# Patient Record
Sex: FEMALE | Race: WHITE | NOT HISPANIC OR LATINO | Employment: OTHER | URBAN - METROPOLITAN AREA
[De-identification: names, ages, dates, MRNs, and addresses within clinical notes are randomized per-mention and may not be internally consistent; named-entity substitution may affect disease eponyms.]

---

## 2019-05-14 ENCOUNTER — HOSPITAL ENCOUNTER (EMERGENCY)
Facility: HOSPITAL | Age: 64
End: 2019-05-15
Attending: EMERGENCY MEDICINE | Admitting: EMERGENCY MEDICINE
Payer: COMMERCIAL

## 2019-05-14 ENCOUNTER — APPOINTMENT (EMERGENCY)
Dept: CT IMAGING | Facility: HOSPITAL | Age: 64
End: 2019-05-14
Payer: COMMERCIAL

## 2019-05-14 VITALS
DIASTOLIC BLOOD PRESSURE: 51 MMHG | TEMPERATURE: 97.6 F | OXYGEN SATURATION: 100 % | WEIGHT: 151.68 LBS | SYSTOLIC BLOOD PRESSURE: 103 MMHG | HEART RATE: 65 BPM | RESPIRATION RATE: 17 BRPM

## 2019-05-14 DIAGNOSIS — S32.010A CLOSED COMPRESSION FRACTURE OF BODY OF L1 VERTEBRA (HCC): Primary | ICD-10-CM

## 2019-05-14 PROCEDURE — 99284 EMERGENCY DEPT VISIT MOD MDM: CPT | Performed by: EMERGENCY MEDICINE

## 2019-05-14 PROCEDURE — 72131 CT LUMBAR SPINE W/O DYE: CPT

## 2019-05-14 PROCEDURE — 99285 EMERGENCY DEPT VISIT HI MDM: CPT

## 2019-05-14 RX ORDER — OXYCODONE HYDROCHLORIDE 5 MG/1
5 TABLET ORAL ONCE
Status: COMPLETED | OUTPATIENT
Start: 2019-05-14 | End: 2019-05-14

## 2019-05-14 RX ORDER — ONDANSETRON 4 MG/1
4 TABLET, ORALLY DISINTEGRATING ORAL ONCE
Status: COMPLETED | OUTPATIENT
Start: 2019-05-14 | End: 2019-05-14

## 2019-05-14 RX ORDER — LIDOCAINE 50 MG/G
1 PATCH TOPICAL ONCE
Status: DISCONTINUED | OUTPATIENT
Start: 2019-05-14 | End: 2019-05-15 | Stop reason: HOSPADM

## 2019-05-14 RX ORDER — ACETAMINOPHEN 325 MG/1
975 TABLET ORAL ONCE
Status: COMPLETED | OUTPATIENT
Start: 2019-05-14 | End: 2019-05-14

## 2019-05-14 RX ADMIN — ONDANSETRON 4 MG: 4 TABLET, ORALLY DISINTEGRATING ORAL at 22:50

## 2019-05-14 RX ADMIN — OXYCODONE HYDROCHLORIDE 5 MG: 5 TABLET ORAL at 23:41

## 2019-05-14 RX ADMIN — OXYCODONE HYDROCHLORIDE 5 MG: 5 TABLET ORAL at 22:50

## 2019-05-14 RX ADMIN — ACETAMINOPHEN 975 MG: 325 TABLET, FILM COATED ORAL at 22:50

## 2019-05-14 RX ADMIN — LIDOCAINE 1 PATCH: 50 PATCH TOPICAL at 23:34

## 2019-05-15 ENCOUNTER — APPOINTMENT (OUTPATIENT)
Dept: RADIOLOGY | Facility: HOSPITAL | Age: 64
End: 2019-05-15
Payer: COMMERCIAL

## 2019-05-15 ENCOUNTER — HOSPITAL ENCOUNTER (OUTPATIENT)
Facility: HOSPITAL | Age: 64
Setting detail: OBSERVATION
Discharge: HOME | End: 2019-05-16
Attending: SURGERY | Admitting: SURGERY
Payer: COMMERCIAL

## 2019-05-15 DIAGNOSIS — S32.010A CLOSED COMPRESSION FRACTURE OF L1 LUMBAR VERTEBRA, INITIAL ENCOUNTER (HCC): Primary | ICD-10-CM

## 2019-05-15 PROBLEM — Z86.69 HX OF MIGRAINE HEADACHES: Status: ACTIVE | Noted: 2019-05-15

## 2019-05-15 LAB
ANION GAP BLD CALC-SCNC: 16 MMOL/L (ref 4–13)
ANION GAP SERPL CALCULATED.3IONS-SCNC: 5 MMOL/L (ref 4–13)
BASOPHILS # BLD AUTO: 0.03 THOUSANDS/ΜL (ref 0–0.1)
BASOPHILS NFR BLD AUTO: 0 % (ref 0–1)
BUN BLD-MCNC: 7 MG/DL (ref 5–25)
BUN SERPL-MCNC: 7 MG/DL (ref 5–25)
CA-I BLD-SCNC: 1.12 MMOL/L (ref 1.12–1.32)
CALCIUM SERPL-MCNC: 8.4 MG/DL (ref 8.3–10.1)
CHLORIDE BLD-SCNC: 103 MMOL/L (ref 100–108)
CHLORIDE SERPL-SCNC: 106 MMOL/L (ref 100–108)
CO2 SERPL-SCNC: 26 MMOL/L (ref 21–32)
CREAT BLD-MCNC: 0.6 MG/DL (ref 0.6–1.3)
CREAT SERPL-MCNC: 0.73 MG/DL (ref 0.6–1.3)
EOSINOPHIL # BLD AUTO: 0.05 THOUSAND/ΜL (ref 0–0.61)
EOSINOPHIL NFR BLD AUTO: 1 % (ref 0–6)
ERYTHROCYTE [DISTWIDTH] IN BLOOD BY AUTOMATED COUNT: 12 % (ref 11.6–15.1)
GFR SERPL CREATININE-BSD FRML MDRD: 87 ML/MIN/1.73SQ M
GFR SERPL CREATININE-BSD FRML MDRD: 96 ML/MIN/1.73SQ M
GLUCOSE SERPL-MCNC: 103 MG/DL (ref 65–140)
GLUCOSE SERPL-MCNC: 123 MG/DL (ref 65–140)
HCT VFR BLD AUTO: 36.9 % (ref 34.8–46.1)
HCT VFR BLD CALC: 37 % (ref 34.8–46.1)
HGB BLD-MCNC: 12.4 G/DL (ref 11.5–15.4)
HGB BLDA-MCNC: 12.6 G/DL (ref 11.5–15.4)
IMM GRANULOCYTES # BLD AUTO: 0.02 THOUSAND/UL (ref 0–0.2)
IMM GRANULOCYTES NFR BLD AUTO: 0 % (ref 0–2)
LYMPHOCYTES # BLD AUTO: 1.27 THOUSANDS/ΜL (ref 0.6–4.47)
LYMPHOCYTES NFR BLD AUTO: 17 % (ref 14–44)
MCH RBC QN AUTO: 30.5 PG (ref 26.8–34.3)
MCHC RBC AUTO-ENTMCNC: 33.6 G/DL (ref 31.4–37.4)
MCV RBC AUTO: 91 FL (ref 82–98)
MONOCYTES # BLD AUTO: 0.69 THOUSAND/ΜL (ref 0.17–1.22)
MONOCYTES NFR BLD AUTO: 9 % (ref 4–12)
NEUTROPHILS # BLD AUTO: 5.31 THOUSANDS/ΜL (ref 1.85–7.62)
NEUTS SEG NFR BLD AUTO: 73 % (ref 43–75)
NRBC BLD AUTO-RTO: 0 /100 WBCS
PCO2 BLD: 24 MMOL/L (ref 21–32)
PLATELET # BLD AUTO: 212 THOUSANDS/UL (ref 149–390)
PLATELET # BLD AUTO: 219 THOUSANDS/UL (ref 149–390)
PMV BLD AUTO: 10.6 FL (ref 8.9–12.7)
PMV BLD AUTO: 11 FL (ref 8.9–12.7)
POTASSIUM BLD-SCNC: 3.6 MMOL/L (ref 3.5–5.3)
POTASSIUM SERPL-SCNC: 3.6 MMOL/L (ref 3.5–5.3)
RBC # BLD AUTO: 4.07 MILLION/UL (ref 3.81–5.12)
SODIUM BLD-SCNC: 138 MMOL/L (ref 136–145)
SODIUM SERPL-SCNC: 137 MMOL/L (ref 136–145)
SPECIMEN SOURCE: ABNORMAL
WBC # BLD AUTO: 7.37 THOUSAND/UL (ref 4.31–10.16)

## 2019-05-15 PROCEDURE — 80047 BASIC METABLC PNL IONIZED CA: CPT

## 2019-05-15 PROCEDURE — 85049 AUTOMATED PLATELET COUNT: CPT | Performed by: EMERGENCY MEDICINE

## 2019-05-15 PROCEDURE — 99024 POSTOP FOLLOW-UP VISIT: CPT | Performed by: PHYSICIAN ASSISTANT

## 2019-05-15 PROCEDURE — 85025 COMPLETE CBC W/AUTO DIFF WBC: CPT | Performed by: PHYSICIAN ASSISTANT

## 2019-05-15 PROCEDURE — G8979 MOBILITY GOAL STATUS: HCPCS

## 2019-05-15 PROCEDURE — 99219 PR INITIAL OBSERVATION CARE/DAY 50 MINUTES: CPT | Performed by: SURGERY

## 2019-05-15 PROCEDURE — 72100 X-RAY EXAM L-S SPINE 2/3 VWS: CPT

## 2019-05-15 PROCEDURE — 72148 MRI LUMBAR SPINE W/O DYE: CPT

## 2019-05-15 PROCEDURE — NC001 PR NO CHARGE: Performed by: SURGERY

## 2019-05-15 PROCEDURE — 99285 EMERGENCY DEPT VISIT HI MDM: CPT

## 2019-05-15 PROCEDURE — 70450 CT HEAD/BRAIN W/O DYE: CPT

## 2019-05-15 PROCEDURE — G8978 MOBILITY CURRENT STATUS: HCPCS

## 2019-05-15 PROCEDURE — 80048 BASIC METABOLIC PNL TOTAL CA: CPT | Performed by: PHYSICIAN ASSISTANT

## 2019-05-15 PROCEDURE — 97163 PT EVAL HIGH COMPLEX 45 MIN: CPT

## 2019-05-15 PROCEDURE — 85014 HEMATOCRIT: CPT

## 2019-05-15 RX ORDER — OXYCODONE HYDROCHLORIDE 5 MG/1
5 TABLET ORAL EVERY 4 HOURS PRN
Status: DISCONTINUED | OUTPATIENT
Start: 2019-05-15 | End: 2019-05-16 | Stop reason: HOSPADM

## 2019-05-15 RX ORDER — METOCLOPRAMIDE HYDROCHLORIDE 5 MG/ML
10 INJECTION INTRAMUSCULAR; INTRAVENOUS EVERY 6 HOURS PRN
Status: DISCONTINUED | OUTPATIENT
Start: 2019-05-15 | End: 2019-05-15

## 2019-05-15 RX ORDER — SODIUM CHLORIDE, SODIUM GLUCONATE, SODIUM ACETATE, POTASSIUM CHLORIDE, MAGNESIUM CHLORIDE, SODIUM PHOSPHATE, DIBASIC, AND POTASSIUM PHOSPHATE .53; .5; .37; .037; .03; .012; .00082 G/100ML; G/100ML; G/100ML; G/100ML; G/100ML; G/100ML; G/100ML
100 INJECTION, SOLUTION INTRAVENOUS CONTINUOUS
Status: DISCONTINUED | OUTPATIENT
Start: 2019-05-15 | End: 2019-05-16 | Stop reason: HOSPADM

## 2019-05-15 RX ORDER — ACETAMINOPHEN 325 MG/1
650 TABLET ORAL EVERY 4 HOURS PRN
Status: DISCONTINUED | OUTPATIENT
Start: 2019-05-15 | End: 2019-05-15

## 2019-05-15 RX ORDER — SENNOSIDES 8.6 MG
8.6 TABLET ORAL DAILY
Status: DISCONTINUED | OUTPATIENT
Start: 2019-05-15 | End: 2019-05-16 | Stop reason: HOSPADM

## 2019-05-15 RX ORDER — OXYCODONE HYDROCHLORIDE 5 MG/1
2.5 TABLET ORAL EVERY 4 HOURS PRN
Status: DISCONTINUED | OUTPATIENT
Start: 2019-05-15 | End: 2019-05-16 | Stop reason: HOSPADM

## 2019-05-15 RX ORDER — ONDANSETRON 2 MG/ML
4 INJECTION INTRAMUSCULAR; INTRAVENOUS EVERY 4 HOURS PRN
Status: DISCONTINUED | OUTPATIENT
Start: 2019-05-15 | End: 2019-05-16 | Stop reason: HOSPADM

## 2019-05-15 RX ORDER — ACETAMINOPHEN 325 MG/1
975 TABLET ORAL EVERY 8 HOURS SCHEDULED
Status: DISCONTINUED | OUTPATIENT
Start: 2019-05-15 | End: 2019-05-16 | Stop reason: HOSPADM

## 2019-05-15 RX ORDER — OXYCODONE HYDROCHLORIDE 10 MG/1
10 TABLET ORAL EVERY 4 HOURS PRN
Status: DISCONTINUED | OUTPATIENT
Start: 2019-05-15 | End: 2019-05-15

## 2019-05-15 RX ORDER — OXYCODONE HYDROCHLORIDE 5 MG/1
5 TABLET ORAL EVERY 4 HOURS PRN
Status: DISCONTINUED | OUTPATIENT
Start: 2019-05-15 | End: 2019-05-15

## 2019-05-15 RX ORDER — METHOCARBAMOL 500 MG/1
500 TABLET, FILM COATED ORAL EVERY 6 HOURS SCHEDULED
Status: DISCONTINUED | OUTPATIENT
Start: 2019-05-15 | End: 2019-05-16 | Stop reason: HOSPADM

## 2019-05-15 RX ADMIN — METHOCARBAMOL 500 MG: 500 TABLET, FILM COATED ORAL at 23:02

## 2019-05-15 RX ADMIN — SODIUM CHLORIDE, SODIUM GLUCONATE, SODIUM ACETATE, POTASSIUM CHLORIDE, MAGNESIUM CHLORIDE, SODIUM PHOSPHATE, DIBASIC, AND POTASSIUM PHOSPHATE 100 ML/HR: .53; .5; .37; .037; .03; .012; .00082 INJECTION, SOLUTION INTRAVENOUS at 11:45

## 2019-05-15 RX ADMIN — ACETAMINOPHEN 975 MG: 325 TABLET ORAL at 23:02

## 2019-05-15 RX ADMIN — METHOCARBAMOL 500 MG: 500 TABLET, FILM COATED ORAL at 17:48

## 2019-05-15 RX ADMIN — ONDANSETRON 4 MG: 2 INJECTION INTRAMUSCULAR; INTRAVENOUS at 04:30

## 2019-05-15 RX ADMIN — METHOCARBAMOL 500 MG: 500 TABLET, FILM COATED ORAL at 05:12

## 2019-05-15 RX ADMIN — ENOXAPARIN SODIUM 40 MG: 40 INJECTION SUBCUTANEOUS at 14:46

## 2019-05-15 RX ADMIN — ONDANSETRON 4 MG: 2 INJECTION INTRAMUSCULAR; INTRAVENOUS at 08:41

## 2019-05-15 RX ADMIN — ACETAMINOPHEN 975 MG: 325 TABLET ORAL at 14:46

## 2019-05-15 RX ADMIN — METOCLOPRAMIDE 10 MG: 5 INJECTION, SOLUTION INTRAMUSCULAR; INTRAVENOUS at 11:48

## 2019-05-16 VITALS
OXYGEN SATURATION: 96 % | HEART RATE: 66 BPM | TEMPERATURE: 98.3 F | RESPIRATION RATE: 18 BRPM | DIASTOLIC BLOOD PRESSURE: 60 MMHG | HEIGHT: 65 IN | BODY MASS INDEX: 25.27 KG/M2 | WEIGHT: 151.68 LBS | SYSTOLIC BLOOD PRESSURE: 108 MMHG

## 2019-05-16 LAB
ANION GAP SERPL CALCULATED.3IONS-SCNC: 4 MMOL/L (ref 4–13)
APTT PPP: 31 SECONDS (ref 26–38)
BASOPHILS # BLD AUTO: 0.03 THOUSANDS/ΜL (ref 0–0.1)
BASOPHILS NFR BLD AUTO: 1 % (ref 0–1)
BUN SERPL-MCNC: 7 MG/DL (ref 5–25)
CALCIUM SERPL-MCNC: 8.3 MG/DL (ref 8.3–10.1)
CHLORIDE SERPL-SCNC: 107 MMOL/L (ref 100–108)
CO2 SERPL-SCNC: 30 MMOL/L (ref 21–32)
CREAT SERPL-MCNC: 0.75 MG/DL (ref 0.6–1.3)
EOSINOPHIL # BLD AUTO: 0.07 THOUSAND/ΜL (ref 0–0.61)
EOSINOPHIL NFR BLD AUTO: 1 % (ref 0–6)
ERYTHROCYTE [DISTWIDTH] IN BLOOD BY AUTOMATED COUNT: 12.2 % (ref 11.6–15.1)
GFR SERPL CREATININE-BSD FRML MDRD: 84 ML/MIN/1.73SQ M
GLUCOSE SERPL-MCNC: 95 MG/DL (ref 65–140)
HCT VFR BLD AUTO: 35.6 % (ref 34.8–46.1)
HGB BLD-MCNC: 11.8 G/DL (ref 11.5–15.4)
IMM GRANULOCYTES # BLD AUTO: 0.01 THOUSAND/UL (ref 0–0.2)
IMM GRANULOCYTES NFR BLD AUTO: 0 % (ref 0–2)
INR PPP: 1.1 (ref 0.86–1.17)
LYMPHOCYTES # BLD AUTO: 1.54 THOUSANDS/ΜL (ref 0.6–4.47)
LYMPHOCYTES NFR BLD AUTO: 28 % (ref 14–44)
MCH RBC QN AUTO: 30.3 PG (ref 26.8–34.3)
MCHC RBC AUTO-ENTMCNC: 33.1 G/DL (ref 31.4–37.4)
MCV RBC AUTO: 92 FL (ref 82–98)
MONOCYTES # BLD AUTO: 0.41 THOUSAND/ΜL (ref 0.17–1.22)
MONOCYTES NFR BLD AUTO: 7 % (ref 4–12)
NEUTROPHILS # BLD AUTO: 3.52 THOUSANDS/ΜL (ref 1.85–7.62)
NEUTS SEG NFR BLD AUTO: 63 % (ref 43–75)
NRBC BLD AUTO-RTO: 0 /100 WBCS
PLATELET # BLD AUTO: 218 THOUSANDS/UL (ref 149–390)
PMV BLD AUTO: 11.2 FL (ref 8.9–12.7)
POTASSIUM SERPL-SCNC: 3.4 MMOL/L (ref 3.5–5.3)
PROTHROMBIN TIME: 14.3 SECONDS (ref 11.8–14.2)
RBC # BLD AUTO: 3.89 MILLION/UL (ref 3.81–5.12)
SODIUM SERPL-SCNC: 141 MMOL/L (ref 136–145)
WBC # BLD AUTO: 5.58 THOUSAND/UL (ref 4.31–10.16)

## 2019-05-16 PROCEDURE — 85610 PROTHROMBIN TIME: CPT | Performed by: RADIOLOGY

## 2019-05-16 PROCEDURE — 80048 BASIC METABOLIC PNL TOTAL CA: CPT | Performed by: PHYSICIAN ASSISTANT

## 2019-05-16 PROCEDURE — 85025 COMPLETE CBC W/AUTO DIFF WBC: CPT | Performed by: PHYSICIAN ASSISTANT

## 2019-05-16 PROCEDURE — NC001 PR NO CHARGE: Performed by: SURGERY

## 2019-05-16 PROCEDURE — 85730 THROMBOPLASTIN TIME PARTIAL: CPT | Performed by: RADIOLOGY

## 2019-05-16 RX ORDER — METHOCARBAMOL 500 MG/1
500 TABLET, FILM COATED ORAL EVERY 6 HOURS SCHEDULED
Qty: 28 TABLET | Refills: 0 | Status: SHIPPED | OUTPATIENT
Start: 2019-05-16

## 2019-05-16 RX ADMIN — METHOCARBAMOL 500 MG: 500 TABLET, FILM COATED ORAL at 05:37

## 2019-05-16 RX ADMIN — ACETAMINOPHEN 975 MG: 325 TABLET ORAL at 05:37

## 2021-02-26 ENCOUNTER — CLINICAL SUPPORT (OUTPATIENT)
Dept: FAMILY MEDICINE | Facility: CLINIC | Age: 66
End: 2021-02-26

## 2021-02-26 DIAGNOSIS — Z23 ENCOUNTER FOR VACCINATION: Primary | ICD-10-CM

## 2021-03-19 ENCOUNTER — CLINICAL SUPPORT (OUTPATIENT)
Dept: FAMILY MEDICINE | Facility: CLINIC | Age: 66
End: 2021-03-19

## 2021-03-19 DIAGNOSIS — Z23 ENCOUNTER FOR VACCINATION: Primary | ICD-10-CM

## 2021-05-07 ENCOUNTER — HOSPITAL ENCOUNTER (OUTPATIENT)
Dept: RADIOLOGY | Facility: HOSPITAL | Age: 66
Discharge: HOME | End: 2021-05-07
Payer: MEDICARE

## 2021-05-07 ENCOUNTER — CONSULT (OUTPATIENT)
Dept: PODIATRY | Facility: CLINIC | Age: 66
End: 2021-05-07
Payer: MEDICARE

## 2021-05-07 VITALS
OXYGEN SATURATION: 96 % | HEIGHT: 64 IN | HEART RATE: 67 BPM | RESPIRATION RATE: 19 BRPM | DIASTOLIC BLOOD PRESSURE: 54 MMHG | BODY MASS INDEX: 24.75 KG/M2 | WEIGHT: 145 LBS | SYSTOLIC BLOOD PRESSURE: 116 MMHG

## 2021-05-07 DIAGNOSIS — M20.12 HALLUX VALGUS OF LEFT FOOT: ICD-10-CM

## 2021-05-07 DIAGNOSIS — M79.672 LEFT FOOT PAIN: Primary | ICD-10-CM

## 2021-05-07 PROCEDURE — G0463 HOSPITAL OUTPT CLINIC VISIT: HCPCS | Performed by: PODIATRIST

## 2021-05-07 PROCEDURE — 99203 OFFICE O/P NEW LOW 30 MIN: CPT | Performed by: PODIATRIST

## 2021-05-07 PROCEDURE — 73630 X-RAY EXAM OF FOOT: CPT | Mod: LT

## 2021-05-07 RX ORDER — PNV NO.95/FERROUS FUM/FOLIC AC 28MG-0.8MG
TABLET ORAL
COMMUNITY

## 2021-05-07 ASSESSMENT — ENCOUNTER SYMPTOMS
WOUND: 0
VOMITING: 0
SHORTNESS OF BREATH: 0
HEMATOLOGIC/LYMPHATIC NEGATIVE: 1
ARTHRALGIAS: 1
ENDOCRINE NEGATIVE: 1
FEVER: 0

## 2021-05-07 ASSESSMENT — PAIN SCALES - GENERAL: PAINLEVEL: 3

## 2021-05-07 NOTE — PROGRESS NOTES
Subjective      I would like your consent to use a new technology to help document today’s visit. The technology is called Dragon Ambient eXperience or MARCUS for short. The AMRCUS technology will securely listen to your visit and convert what it hears into an office visit note. Would you be comfortable using MARCUS during your visit today?    Patient's response: YES    CHIEF COMPLAINT  Bev Oliver is a 66 y.o. female who presents for Consult (Bunion on left foot, big toe)        HPI  HISTORY OF PRESENT ILLNESS  The patient presents for surgical evaluation of a left bunion deformity. She previously had a bunionectomy on the right foot in 2013 or 2014 by Dr. Coronado. The patient was referred by an existing patient. Her pain is localized to her left bunion deformity. She remarks that the pain fluctuates in intensity. The patient remarks that she uses a lot of menthol for pain management. She denies previous knowledge of a left fifth metatarsal fracture, which is observed radiographically. The patient remarks that she has an extra bone in her feet that has been present since birth.    Left bunion deformity the pain is exacerbated with weightbearing activity and tight fitting shoes.        The following have been reviewed and updated as appropriate in this visit:    PAST HISTORY  • Medical:  Past Medical History:   Diagnosis Date   • H/O thumb surgery 1998     • Surgical:   Past Surgical History:   Procedure Laterality Date   • BUNIONECTOMY  2013       FAMILY HISTORY  No family history on file.    SOCIAL HISTORY  Social History     Occupational History   • Not on file   Tobacco Use   • Smoking status: Never Smoker   • Smokeless tobacco: Never Used   Substance and Sexual Activity   • Alcohol use: Not on file   • Drug use: Not on file   • Sexual activity: Not on file   Social History Narrative   • Not on file       ALLERGY  No Known Allergies    CURRENT MEDICATIONS  Current Outpatient Medications   Medication Sig Dispense Refill  "  • calcium carbonate-vitamin D3 (Calcium 500 With D) 500 mg(1,250mg) -400 unit tablet Take by mouth     • diphenhydrAMINE (BENADRYL) 25 mg capsule prn sleep     • carbamazepine (CARBATROL) 100 mg 12 hr capsule 1 in am, 1 at night 50 10   • levETIRAcetam (KEPPRA) 500 mg tablet 1 in am and 1 at night 120 6     No current facility-administered medications for this visit.       REVIEW OF SYSTEMS  Review of Systems   Constitutional: Negative for fever.   Respiratory: Negative for shortness of breath.    Cardiovascular: Negative for chest pain.   Gastrointestinal: Negative for vomiting.   Endocrine: Negative.    Musculoskeletal: Positive for arthralgias.   Skin: Negative for wound.   Hematological: Negative.        VITALS  /54   Pulse 67   Resp 19   Ht 1.626 m (5' 4\")   Wt 65.8 kg (145 lb)   SpO2 96%   BMI 24.89 kg/m²     PHYSICAL EXAMINATION  General Appearance:   No acute distress. Well-appearing. Patient appears stated age.       Psychiatric:   Well oriented. Normal affect.       Head:   Normal visual tracking. Hearing intact to spoken word. Normocephalic.      Lungs:   Unlabored breathing. No supplemental oxygen needed.      Vascular:   Palpable pedal pulses bilaterally.  Capillary fill time intact at the level of the digits.      Neurological:   Grossly intact to light touch. Epicritic sensation intact.       Integument:   Warm, dry and supple. Integument intact. No open lesions or macerations. No ulcerations.       Musculoskeletal:   Lateral deviation of bilateral hallux is observed, left greater than right. Engine motion to the left first MPJ is without pain or crepitus. There is limited range of motion to the right first MPJ.  Bilateral foot muscle strength graded 5/5 with dorsiflexion, plantarflexion, inversion, eversion.    ASSESSMENT  Problem List Items Addressed This Visit     None      Visit Diagnoses     Left foot pain    -  Primary    Relevant Orders    X-ray foot 3 or more views left " (Completed)    Hallux valgus of left foot            X-ray of the left foot, taken today, was reviewed during the visit. Diagnosis of hallux valgus is reviewed and discussed with the patient. Handout on bunion deformity is provided and reviewed.  Patient feels that she has exhausted all conservative measures present. Surgically I have discussed a Lapiplasty bunionectomy procedure. A handout on Lapiplasty is provided the patient and reviewed in detail. Surgical procedure is reviewed and discussed in detail. All risks benefits and alternative methods of treatment are discussed and reviewed.  All questions have been queried and answered. Postoperative rehabilitation was reviewed. Postoperatively patient would be required to utilize an ambulatory cam boot for up to 8 weeks.    PLAN  The patient has chosen to consult with her  and contact the clinic if she chooses to go forward with the procedure.Patient will be provided a left-sided Lapiplasty first metatarsocuneiform joint fusion with possible Fabian osteotomy, possible intercuneiform joint fusion, possible bone graft harvest from calcaneus.    ATTESTATION?   Draft generated by Mohsen VELAZQUEZ and edited by Kandy Murrieta, Quality  on 05/07/21.

## 2021-05-07 NOTE — LETTER
[facility]  [address] [facility] [place] [address]  [phone]  Dept: [phone]  May 17, 2021     Breonna Flannery CNP  [address] [facility] [place] [address]    Patient: Bev Oliver   YOB: 1955   Date of Visit: 5/7/2021       To:  Breonna Flannery CNP    Our mutual patient, Bev Oliver, was seen in my office on 5/7/2021. Below are my notes.     Param Wheat DPM  5/17/2021 10:28 AM  Signed  Subjective      I would like your consent to use a new technology to help document today’s visit. The technology is called Dragon Ambient eXperience or MARCUS for short. The MARCUS technology will securely listen to your visit and convert what it hears into an office visit note. Would you be comfortable using MARCUS during your visit today?    Patient's response: YES    CHIEF COMPLAINT  Bev Oliver is a 66 y.o. female who presents for Consult (Bunion on left foot, big toe)        HPI  HISTORY OF PRESENT ILLNESS  The patient presents for surgical evaluation of a left bunion deformity. She previously had a bunionectomy on the right foot in 2013 or 2014 by Dr. Coronado. The patient was referred by an existing patient. Her pain is localized to her left bunion deformity. She remarks that the pain fluctuates in intensity. The patient remarks that she uses a lot of menthol for pain management. She denies previous knowledge of a left fifth metatarsal fracture, which is observed radiographically. The patient remarks that she has an extra bone in her feet that has been present since birth.    Left bunion deformity the pain is exacerbated with weightbearing activity and tight fitting shoes.        The following have been reviewed and updated as appropriate in this visit:    PAST HISTORY  • Medical:  Past Medical History:   Diagnosis Date   • H/O thumb surgery 1998     • Surgical:   Past Surgical History:   Procedure Laterality Date   • BUNIONECTOMY  2013       FAMILY HISTORY  No family history on  "file.    SOCIAL HISTORY  Social History     Occupational History   • Not on file   Tobacco Use   • Smoking status: Never Smoker   • Smokeless tobacco: Never Used   Substance and Sexual Activity   • Alcohol use: Not on file   • Drug use: Not on file   • Sexual activity: Not on file   Social History Narrative   • Not on file       ALLERGY  No Known Allergies    CURRENT MEDICATIONS  Current Outpatient Medications   Medication Sig Dispense Refill   • calcium carbonate-vitamin D3 (Calcium 500 With D) 500 mg(1,250mg) -400 unit tablet Take by mouth     • diphenhydrAMINE (BENADRYL) 25 mg capsule prn sleep     • carbamazepine (CARBATROL) 100 mg 12 hr capsule 1 in am, 1 at night 50 10   • levETIRAcetam (KEPPRA) 500 mg tablet 1 in am and 1 at night 120 6     No current facility-administered medications for this visit.       REVIEW OF SYSTEMS  Review of Systems   Constitutional: Negative for fever.   Respiratory: Negative for shortness of breath.    Cardiovascular: Negative for chest pain.   Gastrointestinal: Negative for vomiting.   Endocrine: Negative.    Musculoskeletal: Positive for arthralgias.   Skin: Negative for wound.   Hematological: Negative.        VITALS  /54   Pulse 67   Resp 19   Ht 1.626 m (5' 4\")   Wt 65.8 kg (145 lb)   SpO2 96%   BMI 24.89 kg/m²     PHYSICAL EXAMINATION  General Appearance:   No acute distress. Well-appearing. Patient appears stated age.       Psychiatric:   Well oriented. Normal affect.       Head:   Normal visual tracking. Hearing intact to spoken word. Normocephalic.      Lungs:   Unlabored breathing. No supplemental oxygen needed.      Vascular:   Palpable pedal pulses bilaterally.  Capillary fill time intact at the level of the digits.      Neurological:   Grossly intact to light touch. Epicritic sensation intact.       Integument:   Warm, dry and supple. Integument intact. No open lesions or macerations. No ulcerations.       Musculoskeletal:   Lateral deviation of bilateral " hallux is observed, left greater than right. Engine motion to the left first MPJ is without pain or crepitus. There is limited range of motion to the right first MPJ.  Bilateral foot muscle strength graded 5/5 with dorsiflexion, plantarflexion, inversion, eversion.    ASSESSMENT  Problem List Items Addressed This Visit     None      Visit Diagnoses     Left foot pain    -  Primary    Relevant Orders    X-ray foot 3 or more views left (Completed)    Hallux valgus of left foot            X-ray of the left foot, taken today, was reviewed during the visit. Diagnosis of hallux valgus is reviewed and discussed with the patient. Handout on bunion deformity is provided and reviewed.  Patient feels that she has exhausted all conservative measures present. Surgically I have discussed a Lapiplasty bunionectomy procedure. A handout on Lapiplasty is provided the patient and reviewed in detail. Surgical procedure is reviewed and discussed in detail. All risks benefits and alternative methods of treatment are discussed and reviewed.  All questions have been queried and answered. Postoperative rehabilitation was reviewed. Postoperatively patient would be required to utilize an ambulatory cam boot for up to 8 weeks.    PLAN  The patient has chosen to consult with her  and contact the clinic if she chooses to go forward with the procedure.Patient will be provided a left-sided Lapiplasty first metatarsocuneiform joint fusion with possible Fabian osteotomy, possible intercuneiform joint fusion, possible bone graft harvest from calcaneus.    ATTESTATION?   Draft generated by Mohsen VELAZQUEZ and edited by Kandy Murrieta, Quality  on 05/07/21.                       If you have questions, please do not hesitate to call me. I look forward to following your patient along with you.         Sincerely,        Param Wheat DPM        CC: No Recipients

## 2021-07-09 ENCOUNTER — TELEPHONE (OUTPATIENT)
Dept: PODIATRY | Facility: CLINIC | Age: 66
End: 2021-07-09

## 2021-07-09 NOTE — TELEPHONE ENCOUNTER
*RCVD incoming*    Spoke to:  Bev    Patient Identifiers Verified:  1955 x  [address] [facility] [place] [id number]    Reason for call: Bev left a VM with some questions regarding a shot called reclast? That her other doctor is wanting to prescribe but she didn't know if she could get it or not with wanting to schedule surgery.    Is it okay for our Care Team to communicate your response through MyChart:    Patient notified that they should receive a response from a member of their Care Team within 24hrs:

## 2021-07-12 NOTE — TELEPHONE ENCOUNTER
I contacted Bev and discussed the reclast infusion.  I informed her that I would discuss this with Dr. Wheat and call her back with recommendations.  I informed her that Dr. Wheat is out of the office until Thursday and that I would not be able to discuss this with him until he returns, she stated understanding.

## 2021-07-15 ENCOUNTER — TELEPHONE (OUTPATIENT)
Dept: PODIATRY | Facility: CLINIC | Age: 66
End: 2021-07-15

## 2021-07-15 NOTE — TELEPHONE ENCOUNTER
..*VD incoming*    Spoke to:  Bev    Patient Identifiers Verified:  1955 x  [address] [facility] [place] [address]    Reason for call: Patient is calling after seeing Dr. Wheat for a bunion eval.  She would like to have it shaved off instead of doing a lapiplasty.     Is it okay for our Care Team to communicate your response through MyChart: No    Patient notified that they should receive a response from a member of their Care Team within 24hrs: Yes

## 2021-07-15 NOTE — TELEPHONE ENCOUNTER
I contacted Bev and explained per Dr. Wheat that she can proceed with the reclast injection that we dicussed last week. I also explained to her that Dr. Wheat does not feel shaving the bone down is an appropriate way to help her with her bunion.  I explained that it will not fix the pain she is having and ultimately is not something Dr. Wheat is willing to provide her.  She stated she will most likely proceed with the lapiplasty then and will call back to schedule when she figures out some dates that will work for her.

## 2021-07-15 NOTE — TELEPHONE ENCOUNTER
Message left for Bev to contact the clinic to discuss this and the reclast injection from our prior phone visit.

## 2021-11-03 ENCOUNTER — CLINICAL SUPPORT (OUTPATIENT)
Dept: FAMILY MEDICINE | Facility: CLINIC | Age: 66
End: 2021-11-03
Payer: MEDICARE

## 2021-11-03 DIAGNOSIS — Z23 ENCOUNTER FOR VACCINATION: Primary | ICD-10-CM

## 2021-11-03 PROCEDURE — 0003A PFIZER-BIONTECH SARS-COV-2 VACCINE: CPT | Mod: PO | Performed by: NURSE PRACTITIONER

## 2023-04-12 ENCOUNTER — TELEPHONE (OUTPATIENT)
Dept: NEUROLOGY | Facility: CLINIC | Age: 68
End: 2023-04-12
Payer: MEDICARE

## 2023-04-12 NOTE — TELEPHONE ENCOUNTER
Gloria Reese, pt's referring provider, requests that this pt be scheduled with one of our newer Epileptologists, because she feels this patient's case is perplexing as to what is the cause of the pt's seizures.

## 2023-05-25 ENCOUNTER — CONSULT (OUTPATIENT)
Dept: NEUROLOGY | Facility: CLINIC | Age: 68
End: 2023-05-25
Payer: MEDICARE

## 2023-05-25 VITALS
WEIGHT: 149.5 LBS | BODY MASS INDEX: 25.52 KG/M2 | HEART RATE: 70 BPM | RESPIRATION RATE: 16 BRPM | DIASTOLIC BLOOD PRESSURE: 54 MMHG | OXYGEN SATURATION: 96 % | SYSTOLIC BLOOD PRESSURE: 117 MMHG | HEIGHT: 64 IN

## 2023-05-25 DIAGNOSIS — H81.10 BENIGN PAROXYSMAL POSITIONAL VERTIGO, UNSPECIFIED LATERALITY: ICD-10-CM

## 2023-05-25 DIAGNOSIS — G40.109 EPILEPSY, FOCAL (CMS/HCC): Primary | ICD-10-CM

## 2023-05-25 PROCEDURE — G0463 HOSPITAL OUTPT CLINIC VISIT: HCPCS | Performed by: PSYCHIATRY & NEUROLOGY

## 2023-05-25 PROCEDURE — 99204 OFFICE O/P NEW MOD 45 MIN: CPT | Performed by: PSYCHIATRY & NEUROLOGY

## 2023-05-25 RX ORDER — MECLIZINE HCL 25MG 25 MG/1
25 TABLET, CHEWABLE ORAL 3 TIMES DAILY PRN
Qty: 45 TABLET | Refills: 2 | Status: SHIPPED | OUTPATIENT
Start: 2023-05-25

## 2023-05-25 NOTE — PROGRESS NOTES
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  She vaguely recalls her symptoms, often would be preceded by a burning smell and then she will progress to a generalized tonic-clonic seizure.  Afterwards she would be fatigued.  She was treated initially with phenobarbital, then subsequently transition to Tegretol.  Around 2013, her Tegretol dose was gradually being titrated (although she is unable to clarify why, she believes she had been seizure-free for several years), she began having worsening episodes of ataxia, oversedation.  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  Her dose was reduced, and then she moved out of the area.  Lost contact with her neurologist and ran out of the medication around 2016 and did not restart any antiseizure medications thereafter.  She denies any episodes of loss time, waking up with tongue bites or urinary incontinence.  She has remained seizure-free for at least a decade.    She notes since around 2013 she noticed that she developed some dysarthria and ataxia when she is fatigued.  Sleep or rest resolve the symptoms.  She has no weakness, no vision changes.  She says that around December 2022 she had about 1 straight month of dysarthria and difficulty with balance.  She does note that she has some positional vertigo in which if she looks up or turns her head rapidly from side to side, she will develop a spinning sensation that resolves with sitting very still.    She says that riding in the car can trigger her symptoms.    -Last seizure: Around age 39 years old        Epilepsy risk factors:    -Birth history: normal gestation and delivery, full term vaginal delivery, no  cerebral palsy    -Developmental history: no delays    -Infectious - meningitis, encephalitis - HISTORY OF VIRAL ENCEPHALITIS     -Febrile seizures - denies    -Trauma - Traumatic brain injury - denies    -Tumors/Malignancy - denies    -Family History epilepsy - denies       Current anti-epileptic medications:    None    Past anti-epileptic medications:   Phenobarbital    Previous work-up:   -MRI 2013: normal MRI brain        Histories:    Medical:    Past Medical History:   Diagnosis Date    H/O thumb surgery 1998         Surgical:    Past Surgical History:   Procedure Laterality Date    BUNIONECTOMY  2013         Family:  No family history on file.      Social:    Social History     Socioeconomic History    Marital status:      Spouse name: Not on file    Number of children: Not on file    Years of education: Not on file    Highest education level: Not on file   Occupational History    Not on file   Tobacco Use    Smoking status: Never    Smokeless tobacco: Never   Substance and Sexual Activity    Alcohol use: Not on file    Drug use: Not on file    Sexual activity: Not on file   Other Topics Concern    Not on file   Social History Narrative    Not on file     Social Determinants of Health     Financial Resource Strain: Not on file   Food Insecurity: Not on file   Transportation Needs: Not on file   Physical Activity: Not on file   Stress: Not on file   Social Connections: Not on file   Intimate Partner Violence: Not on file   Housing Stability: Not on file       Allergies:  No Known Allergies    Current Medications:    Current Outpatient Medications   Medication Sig Dispense Refill    calcium carbonate-vitamin D3 (Calcium 500 With D) 500 mg(1,250mg) -400 unit tablet Take by mouth      diphenhydrAMINE (BENADRYL) 25 mg capsule prn sleep      carbamazepine (CARBATROL) 100 mg 12 hr capsule 1 in am, 1 at night 50 10    levETIRAcetam (KEPPRA) 500 mg tablet 1 in am and 1 at night 120 6     No current  facility-administered medications for this visit.       Review of Systems:  See HPI. All other systems were reviewed and were negative.     OBJECTIVE           Physical Exam:    General: No acute distress   HEENT: Head is atraumatic.   Pharynx is clear. Mucus membranes are moist. no oral lesions   Neck: Supple. No carotid bruits.   Heart: Regular rate and rhythm. No murmurs noted.   Lungs: Clear to auscultation; normal respiratory effort   Abdomen: non-tender, bowel sounds present   Extremities: No edema or cyanosis. peripheral pulses intact   Skin: no lesions present in visible areas. warm and dry   Psychological: normal affect. alert and oriented.     Neurological exam:   Mentation and Language  She is pleasant and attentive throughout the interview.  She provides a clear autobiographical history.  Alert and oriented to person, place, time and situation. There is no apparent deficit of comprehension. Fluent speech without word finding difficulty or dysarthria. Affect appears appropriate.      Cranial Nerves  Funduscopic exam shows flat disk margins bilaterally. Pupils equally round and reactive to light. Extraocular movements intact without nystagmus. Visual fields intact to confrontation. Face symmetrical at rest and with activation with full sensation to light touch and pinprick in V1, V2 and V3 distributions. The tongue protrudes in the midline. The palate elevates symmetrically. There is no tremor or other movements of the face.  Negative head thrust test    Motor  Right: deltoid 5/5, biceps 5/5, triceps 5/5, interossei 5/5, iliopsoas 5/5, hamstring 5/5, quads 5/5, TA 5/5, gastroc/soleus 5/5    Left: deltoid 5/5, biceps 5/5, triceps 5/5, interossei 5/5, iliopsoas 5/5, hamstring 5/5, quads 5/5, TA 5/5, gastroc/soleus 5/5     Sensation  Intact to light touch, vibration, temperature bilaterally    Muscle Stretch Reflexes  Graded 2+ and symmetric in bilateral biceps, triceps, brachioradialis, patella, and  Achilles.       Coordination  There is no dysmetria with finger nose finger.      Gait and Station  Rises from the seated position without difficulty. Narrow based gait with good arm swing and stride length. No tremor noted. Walks on toes, heels without difficulty.  Imbalance when walking in tandem.  Normal station without swaying with eyes open.  Oscillates but does not break station with eyes closed.  Positive pull test      Labs:      CBC with Platelet:  No results found for: WBC, HGB, HCT, PLT, RBC, MCV, MCH, MCHC, RDW, MPV  Automated Differential: No results found for: NEUTROABS, NEUTROPCT, IMMGRAN, LYMPHOPCT, LYMPHOABS, MONOMAN, MONOPCT, MONOSABS, EOSMAN, EOSPCT, EOSABS, BASOSMAN, BASOSABS, BASOPCT  Absolute Count:  No results found for: NEUTROABS, LYMPHSABS, MONOABS, EOSABS  Comp: No results found for: NA, K, CL, CO2, BUN, CREATININE, GLUCOSE, CALCIUM, PROT, ALBUMIN, AST, ALT, ALKPHOS, BILITOT  Lipid: No results found for: CHOL, TRIG, HDLC, LDLC, HDL, LDL  TSH: No results found for: TSH    Imaging studies:    No results found.    Assessment:  Pleasant 68-year-old right-handed female with history of epilepsy presents to [facility] care.  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  She has remained seizure-free for almost 30 years, self discontinued antiseizure medications in 2016.  At this point, I will hold off on reinitiating antiseizure medications, I will evaluate further with studies as outlined below.  In addition, she has a positional vertigo so we will try meclizine for further management and I will refer to physical therapy for vestibular training.  She does have a slightly positive pull test, I will evaluate further with MRI brain.  We discussed seizure precautions, driving restrictions do not apply as she has been seizure-free for decades.  I reviewed my impression and plan with the patient, all questions were answered.      Plan:   -        Polo Ribeiro MD      Dictation done using voice recognition software to aid in this documentation. Sometimes words are not transcribed exactly as they were spoken. There may be uncorrected errors or inaccuracies within the document despite efforts made to avoid them.

## 2023-07-26 ENCOUNTER — HOSPITAL ENCOUNTER (OUTPATIENT)
Dept: NEUROLOGY | Facility: HOSPITAL | Age: 68
Discharge: HOME | End: 2023-07-26
Payer: MEDICARE

## 2023-07-26 VITALS — RESPIRATION RATE: 16 BRPM | HEART RATE: 53 BPM

## 2023-07-26 DIAGNOSIS — G40.109 EPILEPSY, FOCAL (CMS/HCC): ICD-10-CM

## 2023-07-26 PROCEDURE — 95816 EEG AWAKE AND DROWSY: CPT

## 2023-07-26 PROCEDURE — 95812 EEG 41-60 MINUTES: CPT | Mod: 26 | Performed by: PSYCHIATRY & NEUROLOGY

## 2023-08-17 ENCOUNTER — OFFICE VISIT (OUTPATIENT)
Dept: NEUROLOGY | Facility: CLINIC | Age: 68
End: 2023-08-17
Payer: MEDICARE

## 2023-08-17 VITALS
BODY MASS INDEX: 23.9 KG/M2 | DIASTOLIC BLOOD PRESSURE: 48 MMHG | WEIGHT: 140 LBS | HEART RATE: 67 BPM | HEIGHT: 64 IN | OXYGEN SATURATION: 97 % | SYSTOLIC BLOOD PRESSURE: 106 MMHG

## 2023-08-17 DIAGNOSIS — G40.109 EPILEPSY, FOCAL (CMS/HCC): ICD-10-CM

## 2023-08-17 DIAGNOSIS — R26.89 FUNCTIONAL GAIT ABNORMALITY: ICD-10-CM

## 2023-08-17 DIAGNOSIS — F44.4 FUNCTIONAL NEUROLOGICAL SYMPTOM DISORDER WITH SPEECH SYMPTOMS: Primary | ICD-10-CM

## 2023-08-17 PROCEDURE — 99213 OFFICE O/P EST LOW 20 MIN: CPT | Performed by: PSYCHIATRY & NEUROLOGY

## 2023-08-17 PROCEDURE — G0463 HOSPITAL OUTPT CLINIC VISIT: HCPCS | Performed by: PSYCHIATRY & NEUROLOGY

## 2023-08-17 NOTE — PROGRESS NOTES
Neurology History and Physical    08/17/23  9:51 AM    No chief complaint on file.    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  She is accompanied by her  who provides additional collateral.    At her last visit she complained of positional vertigo, MRI was ordered but not yet obtained.  Upon further evaluation, she has had spells of functional gait disturbance and functional stuttering.  These episodes often happen in the setting of acute stressors, fatigue or after a recent illness.  They come and go, may remit with distraction and then presented again later.    Her  says with her for gait abnormality, while it is awkward, she has never fallen despite complaint of unsteadiness.    The patient endorses this has been discussed with her in the past as a functional neurologic disorder, but no treatment was offered.  She is interested in treatment options, and has good insight into her symptoms.    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  She vaguely recalls her symptoms, often would be preceded by a burning smell and then she will progress to a generalized tonic-clonic seizure.  Afterwards she would be fatigued.  She was treated initially with phenobarbital, then subsequently transition to Tegretol.  Around 2013, her Tegretol dose was gradually being titrated (although she is unable to clarify why, she believes she had been seizure-free for several years), she began having worsening episodes of ataxia, oversedation.  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  Her dose was reduced, and then she moved out of the area.  Lost contact with her neurologist and ran out of the medication around 2016 and did not  restart any antiseizure medications thereafter.  She denies any episodes of loss time, waking up with tongue bites or urinary incontinence.  She has remained seizure-free for at least a decade.    She notes since around 2013 she noticed that she developed some dysarthria and ataxia when she is fatigued.  Sleep or rest resolve the symptoms.  She has no weakness, no vision changes.  She says that around December 2022 she had about 1 straight month of dysarthria and difficulty with balance.  She does note that she has some positional vertigo in which if she looks up or turns her head rapidly from side to side, she will develop a spinning sensation that resolves with sitting very still.    She says that riding in the car can trigger her symptoms.    -Last seizure: Around age 39 years old        Epilepsy risk factors:    -Birth history: normal gestation and delivery, full term vaginal delivery, no cerebral palsy    -Developmental history: no delays    -Infectious - meningitis, encephalitis - HISTORY OF VIRAL ENCEPHALITIS     -Febrile seizures - denies    -Trauma - Traumatic brain injury - denies    -Tumors/Malignancy - denies    -Family History epilepsy - denies       Current anti-epileptic medications:    None    Past anti-epileptic medications:   Phenobarbital    Previous work-up:   -MRI 2013: normal MRI brain        Histories:    Medical:    Past Medical History:   Diagnosis Date    H/O thumb surgery 1998         Surgical:    Past Surgical History:   Procedure Laterality Date    BUNIONECTOMY  2013         Family:  No family history on file.      Social:    Social History     Socioeconomic History    Marital status:      Spouse name: Not on file    Number of children: Not on file    Years of education: Not on file    Highest education level: Not on file   Occupational History    Not on file   Tobacco Use    Smoking status: Never    Smokeless tobacco: Never   Substance and Sexual Activity    Alcohol use: Not on  file    Drug use: Not on file    Sexual activity: Not on file   Other Topics Concern    Not on file   Social History Narrative    Not on file     Social Determinants of Health     Financial Resource Strain: Not on file   Food Insecurity: Not on file   Transportation Needs: Not on file   Physical Activity: Not on file   Stress: Not on file   Social Connections: Not on file   Intimate Partner Violence: Not on file   Housing Stability: Not on file       Allergies:  No Known Allergies    Current Medications:    Current Outpatient Medications   Medication Sig Dispense Refill    meclizine (ANTIVERT) 25 mg tablet Take 1 tablet (25 mg total) by mouth 3 (three) times a day as needed for dizziness 45 tablet 2    calcium carbonate-vitamin D3 (Calcium 500 With D) 500 mg(1,250mg) -400 unit tablet Take by mouth      carbamazepine (CARBATROL) 100 mg 12 hr capsule 1 in am, 1 at night 50 10    levETIRAcetam (KEPPRA) 500 mg tablet 1 in am and 1 at night 120 6     No current facility-administered medications for this visit.       Review of Systems:  See HPI. All other systems were reviewed and were negative.     OBJECTIVE           Physical Exam:    General: No acute distress   HEENT: Head is atraumatic.   Pharynx is clear. Mucus membranes are moist. no oral lesions   Neck: Supple. No carotid bruits.   Heart: Regular rate and rhythm. No murmurs noted.   Lungs: Clear to auscultation; normal respiratory effort   Abdomen: non-tender, bowel sounds present   Extremities: No edema or cyanosis. peripheral pulses intact   Skin: no lesions present in visible areas. warm and dry   Psychological: normal affect. alert and oriented.     Neurological exam:   Mentation and Language  She is pleasant and attentive throughout the interview.  She provides a clear autobiographical history. There is no apparent deficit of comprehension.  There is functional stuttering and slow speech without paraphasic errors or incorrect substitutions.  She initially  "appears uncomfortable, but with distraction and further conversation, she smiles pleasantly and stuttering resolves with distraction.    Cranial Nerves  Extraocular movements intact without nystagmus. Visual fields intact to confrontation. Face symmetrical at rest and with activation with full sensation to light touch and pinprick in V1, V2 and V3 distributions. The tongue protrudes in the midline. The palate elevates symmetrically. There is no tremor or other movements of the face.  Negative head thrust test    Motor  Right: deltoid 5/5, biceps 5/5, triceps 5/5, interossei 5/5, iliopsoas 5/5, hamstring 5/5, quads 5/5, TA 5/5, gastroc/soleus 5/5    Left: deltoid 5/5, biceps 5/5, triceps 5/5, interossei 5/5, iliopsoas 5/5, hamstring 5/5, quads 5/5, TA 5/5, gastroc/soleus 5/5     Sensation  Intact to light touch bilaterally    Muscle Stretch Reflexes  Graded 2+ and symmetric in bilateral biceps, triceps, brachioradialis, patella, and Achilles.       Coordination  There is no dysmetria with finger nose finger.      Gait and Station  She oscillates from side to side while sitting, but this resolves with distraction.  She has a functional gait in which she leans far back and takes scissoring steps, oscillates forward and to the side, but does not fall.        Labs:      CBC with Platelet:  No results found for: \"WBC\", \"HGB\", \"HCT\", \"PLT\", \"RBC\", \"MCV\", \"MCH\", \"MCHC\", \"RDW\", \"MPV\"  Automated Differential: No results found for: \"NEUTROABS\", \"NEUTROPCT\", \"IMMGRAN\", \"LYMPHOPCT\", \"LYMPHOABS\", \"MONOMAN\", \"MONOPCT\", \"MONOSABS\", \"EOSMAN\", \"EOSPCT\", \"EOSABS\", \"BASOSMAN\", \"BASOSABS\", \"BASOPCT\"  Absolute Count:  No results found for: \"NEUTROABS\", \"LYMPHSABS\", \"MONOABS\", \"EOSABS\"  Comp: No results found for: \"NA\", \"K\", \"CL\", \"CO2\", \"BUN\", \"CREATININE\", \"GLUCOSE\", \"CALCIUM\", \"PROT\", \"ALBUMIN\", \"AST\", \"ALT\", \"ALKPHOS\", \"BILITOT\"  Lipid: No results found for: \"CHOL\", \"TRIG\", \"HDLC\", \"LDLC\", \"HDL\", \"LDL\"  TSH: No results found for: " "\"TSH\"    Imaging studies:    No results found.    Assessment:  Pleasant 68-year-old right-handed female with history of epilepsy presents presents for follow-up.  She has remained seizure-free.  On exam today she has functional stutter and speech, as well as a functional gait with leaning forward or backward and scissoring steps, without stumbling or falling.  Her functional speech resolves during distraction, and there is no evidence of dysmetria on finger-nose-finger.  We discussed her presentation is consistent with a functional etiology, for which the patient is very receptive and states that she has been told this before and she is excepting of the this diagnosis.  However she has not yet been treated for functional disorder, I will refer to neuropsychology for cognitive behavioral therapy.  From a seizure standpoint, she has remained seizure-free, EEG was normal.  MRI was obtained for question of gait imbalance, but given these symptoms are consistent with her previous presentation, we will defer further neuroimaging at this time.  I reviewed my impression and plan with the patient and her .  All questions were answered        Plan:   -Referral to neuropsychology for cognitive behavioral therapy (CBT)  -If unable to get appointment with neuropsychology in a timely fashion, may look for local resources in the area.  Googling therapist in the area with [facility]  -f/u 4 months      Polo Ribeiro MD      Dictation done using voice recognition software to aid in this documentation. Sometimes words are not transcribed exactly as they were spoken. There may be uncorrected errors or inaccuracies within the document despite efforts made to avoid them.   "

## 2023-08-17 NOTE — PATIENT INSTRUCTIONS
-Referral to neuropsychology for cognitive behavioral therapy (CBT)  -If unable to get appointment with neuropsychology in a timely fashion, may look for local resources in the area.  Googling therapist in the area with [facility]  -f/u 4 months

## 2023-08-29 ENCOUNTER — HOSPITAL ENCOUNTER (OUTPATIENT)
Age: 68
Discharge: STILL ADMITTED OR ADMINISTRATIVE | End: 2023-08-29
Payer: MEDICARE

## 2023-08-29 DIAGNOSIS — R26.89 FUNCTIONAL GAIT ABNORMALITY: ICD-10-CM

## 2023-08-29 PROCEDURE — 90791 PSYCH DIAGNOSTIC EVALUATION: CPT | Performed by: SOCIAL WORKER

## 2023-08-30 NOTE — PSYCHOTHERAPY
"8/29/23    REFERRAL INFORMATION:      Bev Oliver is a 68 y.o. female currently residing in [place], [place]. She is referred for individual psychotherapy by Dr. Polo Ribeiro of [facility] due to  Functional Gait Abnormality .    MEDICAL INFORMATION:      Current Medical Problems: There is no problem list on file for this patient.     Surgical History:   Past Surgical History:   Procedure Laterality Date    BUNIONECTOMY  2013      Current Medications:   Current Outpatient Medications:     meclizine (ANTIVERT) 25 mg tablet, Take 1 tablet (25 mg total) by mouth 3 (three) times a day as needed for dizziness, Disp: 45 tablet, Rfl: 2    calcium carbonate-vitamin D3 (Calcium 500 With D) 500 mg(1,250mg) -400 unit tablet, Take by mouth, Disp: , Rfl:     carbamazepine (CARBATROL) 100 mg 12 hr capsule, 1 in am, 1 at night, Disp: 50, Rfl: 10    levETIRAcetam (KEPPRA) 500 mg tablet, 1 in am and 1 at night, Disp: 120, Rfl: 6    BIOPSYCHOSOCIAL HISTORY:      History of Present Illness: Bev reports that her symptoms started about the time that her son passed away in 2011/2012.  She reports she never got to \"grieve properly.\"  She reports she was not able to be around people who knew her son or her as she had recently moved.  She reports being isolated during this time.  She reports not wanting to burden her daughter-in-law during this time as she was having a hard time herself.      She does report prior history of having encephalitis at age 16 in which she was very ill.  During this time she stayed with her parents who helped take care of her.  She reports having to learn to walk and talk all over.  She reports she stayed in the room her grandmother (who was very ill and family took care of her) had lived in.  She reports being put on phenobarbital during this time and then switched to tegretol.  She reports her dose was gradually increased over the years by her doctor.  She began to have symptoms of being toxic and went " "to a neurologist in [place] who got her off the medication and reports that she was on too much medication and actually reported the doctor who had prescribed it to her.  She reports doing better for a time until about her 40's.    She has noticed triggers for her symptoms.  She reports when she gets sick with a virus or gets some type of infection her symptoms return.  When she works too hard to gets overtired she will have symptoms.  She does admit to pushing herself at times which causes her to be more tired.  She will have a hard time talking and had trouble walking.  She reports putting her hand on a wall to help steady herself is helpful.  She reports taking Tia Chi and thinks this was helpful.  She reports feeling the cold floor helps as well.  She will rest and sit and finds this helpful.  She reports she has tried a rubber band on her wrist and taking corina which were of  no help.      She reports getting sick in December and having more symptoms.  Se reports she went to her PCP in March who sent her to Dr. Ribeiro.  She is feeling better.         Sleep Assessment: To be assessed at next visit.        Behavioral/Psychological Assessment: Continue at next visit.  She reports being depressed at one time and was the reason she went to a different doctor and was found to be toxic.          Psychiatric/Substance Treatment: None.         Family History: Grew up with mom and dad in [place].  Dad was in the .  Dad's mom also lived with them.  Grandma was sick and was bedridden.  She often had to \"sit with grandma\" and reports that grandma was not mentally well.  Grandma would have hallucinations.  She reports mom was a \"clean freak.\"  She reports dad was gone a lot.  She does remember hanging out with dad doing projects.  She reports not being real close to either parent.  She has an older brother.  They were not close growing up.  She reports older brother got out of doing a lot of things and that he " and dad often butted heads.  She reports being upset with him as a child as he did not have to sit with grandma or do things around the home.  She reports they are closer now.  She was close to her grandmother on her mom's side.  She reports this grandma was very supportive and someone she could talk to about a variety of things.      She reports getting out of the house as soon as she could.  She  her first  and had 1 son.  Her first  was an alcoholic and was not home very much.  She reports they were  14 years, but he was gone a lot.  During this time she was hopeful they could work things out, but he eventually left the home permanently.      She met her 2nd  through friends.  They have been  25+ years.  She reports they get along very well.  He does have MS.      She moved to [place] years ago for her 's job.  They moved to [place] and she was very isolated.  They then moved to  to help take care of her dad who was ill.  They lived with her parents.  Her dad passed 5 years ago.  They continue to live with mom.            Current Social Dynamics: Lives in a home with her 2nd  of 25+ years.  Her mother also lives with them.  Her 2nd  has 5 children, but they were raised by his ex-wife.  Not a close relationship with these children.     She likes to read, go to the pool, watch birds on TV or art, watch TV shows or the news, listen to music.  She likes shuffle board.  She is in a [employer] group that goes walking.  She does go to the [employer] [facility] at times.          Education/Employment: Graduated high school.  Some college. Worked various jobs over the years.  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  She is retired.         Trauma History: As above.        Diet/Exercise: To be further assessed.      CURRENT FUNCTIONING AND PRESENTATION: Arrival: on time, Affect: appropriate, Mood: euthymic,  Thought Process: goal-directed  and linear, Thought Content: appropriate, Speech: appropriate, Cognition: oriented to person, time, place , and situation, Insight: prepared for treatment, Appearance: appropriate, Eye contact: appropriate , and Cooperativeness: appropriate    ICD-10 DIAGNOSTIC IMPRESSION:        Diagnosis Plan   1. Functional gait abnormality  Ambulatory referral to Neuropsychology    Ambulatory referral to Neuropsychology            CLINICAL IMPRESSION AND TREATMENT SUMMARY: Bev grew up in an emotionally suppressive household.  She then developed encephalitis at a young age and had to learn to walk and talk all over.  She lost her son and feels she never grieved his death properly.   She tends to put a lot of pressure on herself.  All of these things have affected her nervous system.  Bev would benefit from counseling to learn about how our brains work, how to rewire our brains, process her grief and emotions.      Discussed with Bev that learning how our brains process sensations in our body and takes in information from our environment can be helpful in rewiring our brain.  Discussed learning ways to calm our nervous system can improve our quality of life and Bev is agreeable to return.        Today Bev wanted a strategy to learn to help with her symptoms.  We discussed that her touching the wall and feeling the cool floor are ways of grounding.  We discussed what grounding is and how practicing this when not having an episode that we will be more likely to use when we feel an episode starting and how this can help.  Bev is agreeable to trying this over the next 2 weeks.      Goal for therapy will be to learn how current coping mechanisms create a state of alarm in the central nervous system.  Bev will develop the knowledge and skills to change these habitual responses and calm the CNS.  Goal will be to help change the way her  brain interprets the sensations in her body and foster  an overall sense of safety to help reduce the fear response and therefore the danger response of her brain.  It will also be important to for Bev to learn to process her emotions and improve her negative cognitions to improve her quality of life.        Treatment would include:  Motivational Interviewing, CBT, Supportive Therapy, Pain Counseling, Therapeutic Neuroscience Education, Emotion Focused Therapy, Trauma Therapy, DBT.     Frequency of treatment is expected to be every 2 weeks with increased spacing between appointments as therapy progresses.  Expected duration of therapy is 6 months with reevaluation at any time.     Assessment to be continued at next visit as we ran out of time today.    Thank you for this referral and the ability to collaborate in the care of this patient. For questions or additional concerns, please contact me directly at [phone].    Time spent with patient care: 120 minutes.

## 2023-09-12 ENCOUNTER — HOSPITAL ENCOUNTER (OUTPATIENT)
Age: 68
Discharge: STILL ADMITTED OR ADMINISTRATIVE | End: 2023-09-12
Payer: MEDICARE

## 2023-09-12 DIAGNOSIS — R26.89 FUNCTIONAL GAIT ABNORMALITY: Primary | ICD-10-CM

## 2023-09-12 PROCEDURE — 90837 PSYTX W PT 60 MINUTES: CPT | Performed by: SOCIAL WORKER

## 2023-09-12 NOTE — PSYCHOTHERAPY
"9/12/23    MEETING INFORMATION:   Meeting was: outpatient   Meeting lasted: 60 minutes   Client was: on time   Meeting took place at: office   Mode of treatment: individual psychotherapy     ICD-10 PSYCHOLOGICAL/BEHAVIORAL DIAGNOSES:    Diagnosis Plan   1. Functional gait abnormality              TOPICS DISCUSSED:  Bev came with a list of things to discuss today.  She reports wanting to learn to recognize her triggers, recognize when she is getting fatigued, pacing herself.  She reports she learned a lot about the nervous system in [place] when she went to a clinic there.  She understands how she needs to rewire her brain.  Discussed growing up and impact this has on our nervous system.  Discussed her years as a single parent and having to push through and get things done.  Discussed thoughts she has when she begins to feel unwell.  She reports thoughts like \"oh no\" and worry about if she gets sick, she is getting older, etc., and how this feels.  Discussed how our thoughts impact us and she is insightful.  She was able to discuss pacing with her  and feels good about this.  She did utilize the grounding technique with safety messages and was surprised at how good this made her feel.  She has not had an episode since last visit.  She does report having these racing thoughts and how they spiral and we discussed STOP technique today.      TREATMENT GOAL: Goal for therapy will be to learn how current coping mechanisms create a state of alarm in the central nervous system.  Bev will develop the knowledge and skills to change these habitual responses and calm the CNS.  Goal will be to help change the way her  brain interprets the sensations in her body and foster an overall sense of safety to help reduce the fear response and therefore the danger response of her brain.  It will also be important to for Bev to learn to process her emotions and improve her negative cognitions to improve her quality of life.    "     Bev came up with goals she would like to work on in therapy.  Her ultimate goal would be to have less episodes.  She would like to work on: 1) Recognizing triggers; 2) Pacing herself; 3) Recognize when she is getting fatigued.      TREATMENT & INTERVENTIONS: CBT, Mindfulness-Based Psychotherapy, Motivational Interviewing, Psychoeducation, Supportive Therapy, Treatment Planning, and Progress Review      HOMEWORK AND PLAN FOR NEXT VISIT: Bev will continue with grounding exercises with safety messages as she found these helpful.  She will try the STOP technique with a mantra and see if this is helpful for her or not.  She would like to return in 1 month.  Plan for next visit is to review Homework, continue to discuss and work towards her goals for therapy, and discuss any other issues that arise.       ASSESSMENT:   Mood: euthymic   Affect: appropriate    Mental status: oriented x4   Suicide/violence risk: none   Sleep quality: not assessed    Pain assessment: n/a   Participation level: active/eager   Response to treatment: as expected   Other observations/evaluations: None.     Follow up: one month

## 2023-12-18 ENCOUNTER — OFFICE VISIT (OUTPATIENT)
Dept: NEUROLOGY | Facility: CLINIC | Age: 68
End: 2023-12-18
Payer: MEDICARE

## 2023-12-18 VITALS
HEIGHT: 64 IN | HEART RATE: 69 BPM | BODY MASS INDEX: 23.9 KG/M2 | OXYGEN SATURATION: 96 % | RESPIRATION RATE: 18 BRPM | WEIGHT: 140 LBS | DIASTOLIC BLOOD PRESSURE: 68 MMHG | SYSTOLIC BLOOD PRESSURE: 102 MMHG

## 2023-12-18 DIAGNOSIS — G40.109 EPILEPSY, FOCAL (CMS/HCC): ICD-10-CM

## 2023-12-18 DIAGNOSIS — F44.4 FUNCTIONAL NEUROLOGICAL SYMPTOM DISORDER WITH SPEECH SYMPTOMS: ICD-10-CM

## 2023-12-18 DIAGNOSIS — R26.89 FUNCTIONAL GAIT ABNORMALITY: Primary | ICD-10-CM

## 2023-12-18 PROCEDURE — G0463 HOSPITAL OUTPT CLINIC VISIT: HCPCS | Performed by: PSYCHIATRY & NEUROLOGY

## 2023-12-18 PROCEDURE — 99212 OFFICE O/P EST SF 10 MIN: CPT | Performed by: PSYCHIATRY & NEUROLOGY

## 2023-12-18 NOTE — PROGRESS NOTES
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  She has remained seizure-free for greater than 10 years, not currently taking any antiseizure medications.  She was referred to neuropsychology for CBT, she has noticed significant improvement has had no episodes of the functional gait disturbance.      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  She vaguely recalls her symptoms, often would be preceded by a burning smell and then she will progress to a generalized tonic-clonic seizure.  Afterwards she would be fatigued.  She was treated initially with phenobarbital, then subsequently transition to Tegretol.  Around 2013, her Tegretol dose was gradually being titrated (although she is unable to clarify why, she believes she had been seizure-free for several years), she began having worsening episodes of ataxia, oversedation.  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  Her dose was reduced, and then she moved out of the area.  Lost contact with her neurologist and ran out of the medication around 2016 and did not restart any antiseizure medications thereafter.  She denies any episodes of loss time, waking up with tongue bites or urinary incontinence.  She has remained seizure-free for at least a decade.    She notes since around 2013 she noticed that she developed some dysarthria and ataxia when she is fatigued.  Sleep or rest resolve the symptoms.  She has no weakness, no vision changes.  She says that around December 2022 she had about 1 straight month of dysarthria and  difficulty with balance.  She does note that she has some positional vertigo in which if she looks up or turns her head rapidly from side to side, she will develop a spinning sensation that resolves with sitting very still.    She says that riding in the car can trigger her symptoms.    -Last seizure: Around age 39 years old        Epilepsy risk factors:    -Birth history: normal gestation and delivery, full term vaginal delivery, no cerebral palsy    -Developmental history: no delays    -Infectious - meningitis, encephalitis - HISTORY OF VIRAL ENCEPHALITIS     -Febrile seizures - denies    -Trauma - Traumatic brain injury - denies    -Tumors/Malignancy - denies    -Family History epilepsy - denies       Current anti-epileptic medications:    None    Past anti-epileptic medications:   Phenobarbital    Previous work-up:   -MRI 2013: normal MRI brain        Histories:    Medical:    Past Medical History:   Diagnosis Date    H/O thumb surgery 1998         Surgical:    Past Surgical History:   Procedure Laterality Date    BUNIONECTOMY  2013         Family:    Family History   Problem Relation Age of Onset    Aneurysm Mother     Hearing loss Mother     Dementia Father          Social:    Social History     Socioeconomic History    Marital status:      Spouse name: Not on file    Number of children: Not on file    Years of education: Not on file    Highest education level: Not on file   Occupational History    Not on file   Tobacco Use    Smoking status: Never    Smokeless tobacco: Never   Substance and Sexual Activity    Alcohol use: Not Currently    Drug use: Never    Sexual activity: Defer   Other Topics Concern    Not on file   Social History Narrative    Not on file     Social Determinants of Health     Financial Resource Strain: Not on file   Food Insecurity: Not on file   Transportation Needs: Not on file   Physical Activity: Not on file   Stress: Not on file   Social Connections: Not on file   Intimate  Partner Violence: Not on file   Housing Stability: Not on file       Allergies:  No Known Allergies    Current Medications:    Current Outpatient Medications   Medication Sig Dispense Refill    meclizine (ANTIVERT) 25 mg tablet Take 1 tablet (25 mg total) by mouth 3 (three) times a day as needed for dizziness 45 tablet 2    calcium carbonate-vitamin D3 (Calcium 500 With D) 500 mg(1,250mg) -400 unit tablet Take by mouth      carbamazepine (CARBATROL) 100 mg 12 hr capsule 1 in am, 1 at night 50 10     No current facility-administered medications for this visit.       Review of Systems:  See HPI. All other systems were reviewed and were negative.     OBJECTIVE           Physical Exam:    General: No acute distress   HEENT: Head is atraumatic.   Pharynx is clear. Mucus membranes are moist. no oral lesions   Neck: Supple. No carotid bruits.   Heart: Regular rate and rhythm. No murmurs noted.   Lungs: Clear to auscultation; normal respiratory effort   Abdomen: non-tender, bowel sounds present   Extremities: No edema or cyanosis. peripheral pulses intact   Skin: no lesions present in visible areas. warm and dry   Psychological: normal affect. alert and oriented.     Neurological exam:   Mentation and Language  She is pleasant and attentive throughout the interview.  She provides a clear autobiographical history. There is no apparent deficit of comprehension.  There is functional stuttering and slow speech without paraphasic errors or incorrect substitutions.  She initially appears uncomfortable, but with distraction and further conversation, she smiles pleasantly and stuttering resolves with distraction.    Cranial Nerves  Extraocular movements intact without nystagmus. Visual fields intact to confrontation. Face symmetrical at rest and with activation with full sensation to light touch and pinprick in V1, V2 and V3 distributions. The tongue protrudes in the midline. The palate elevates symmetrically. There is no tremor or  "other movements of the face.  Negative head thrust test    Motor  Right: deltoid 5/5, biceps 5/5, triceps 5/5, interossei 5/5, iliopsoas 5/5, hamstring 5/5, quads 5/5, TA 5/5, gastroc/soleus 5/5    Left: deltoid 5/5, biceps 5/5, triceps 5/5, interossei 5/5, iliopsoas 5/5, hamstring 5/5, quads 5/5, TA 5/5, gastroc/soleus 5/5     Sensation  Intact to light touch bilaterally    Muscle Stretch Reflexes  Graded 2+ and symmetric in bilateral biceps, triceps, brachioradialis, patella, and Achilles.       Coordination  There is no dysmetria with finger nose finger.      Gait and Station  She has a normal gait and station.  Good arm swing bilaterally.          Labs:      CBC with Platelet:  No results found for: \"WBC\", \"HGB\", \"HCT\", \"PLT\", \"RBC\", \"MCV\", \"MCH\", \"MCHC\", \"RDW\", \"MPV\"  Automated Differential: No results found for: \"NEUTROABS\", \"NEUTROPCT\", \"IMMGRAN\", \"LYMPHOPCT\", \"LYMPHOABS\", \"MONOMAN\", \"MONOPCT\", \"MONOSABS\", \"EOSMAN\", \"EOSPCT\", \"EOSABS\", \"BASOSMAN\", \"BASOSABS\", \"BASOPCT\"  Absolute Count:  No results found for: \"NEUTROABS\", \"LYMPHSABS\", \"MONOABS\", \"EOSABS\"  Comp: No results found for: \"NA\", \"K\", \"CL\", \"CO2\", \"BUN\", \"CREATININE\", \"GLUCOSE\", \"CALCIUM\", \"PROT\", \"ALBUMIN\", \"AST\", \"ALT\", \"ALKPHOS\", \"BILITOT\"  Lipid: No results found for: \"CHOL\", \"TRIG\", \"HDLC\", \"LDLC\", \"HDL\", \"LDL\"  TSH: No results found for: \"TSH\"    Imaging studies:    No results found.    Assessment:  Pleasant 68-year-old right-handed female with history of epilepsy and functional gait disturbance presents presents for follow-up.  She has responded exquisitely well to CBT, has had no further episodes of her functional gait disturbance.  From the seizure standpoint, she has been seizure-free for greater than 10 years and not currently on medications, we will continue to monitor for now.  I reviewed my impression and plan with the patient.  All questions were answered        Plan:   -f/u 1 year      Polo Ribeiro MD      Dictation done using voice " recognition software to aid in this documentation. Sometimes words are not transcribed exactly as they were spoken. There may be uncorrected errors or inaccuracies within the document despite efforts made to avoid them.

## 2024-12-11 ENCOUNTER — OFFICE VISIT (OUTPATIENT)
Dept: NEUROLOGY | Facility: CLINIC | Age: 69
End: 2024-12-11
Payer: MEDICARE

## 2024-12-11 VITALS
WEIGHT: 140 LBS | DIASTOLIC BLOOD PRESSURE: 90 MMHG | HEART RATE: 62 BPM | BODY MASS INDEX: 23.9 KG/M2 | RESPIRATION RATE: 18 BRPM | SYSTOLIC BLOOD PRESSURE: 120 MMHG | HEIGHT: 64 IN | OXYGEN SATURATION: 99 %

## 2024-12-11 DIAGNOSIS — G40.109 EPILEPSY, FOCAL (CMS/HCC): Primary | ICD-10-CM

## 2024-12-11 DIAGNOSIS — R26.89 FUNCTIONAL GAIT ABNORMALITY: ICD-10-CM

## 2024-12-11 DIAGNOSIS — F44.4 FUNCTIONAL NEUROLOGICAL SYMPTOM DISORDER WITH SPEECH SYMPTOMS: ICD-10-CM

## 2024-12-11 PROCEDURE — G0463 HOSPITAL OUTPT CLINIC VISIT: HCPCS | Performed by: PSYCHIATRY & NEUROLOGY

## 2024-12-11 PROCEDURE — 99214 OFFICE O/P EST MOD 30 MIN: CPT | Performed by: PSYCHIATRY & NEUROLOGY

## 2024-12-11 RX ORDER — IBUPROFEN 200 MG
TABLET ORAL EVERY 12 HOURS
COMMUNITY

## 2024-12-11 RX ORDER — DIPHENHYDRAMINE HCL 25 MG
TABLET ORAL
COMMUNITY

## 2024-12-11 RX ORDER — ALENDRONATE SODIUM 70 MG/1
70 TABLET ORAL
COMMUNITY

## 2024-12-11 NOTE — PROGRESS NOTES
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  She has remained seizure-free for greater than 10 years, not currently taking any antiseizure medications.     No seizures since her last visit.  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  She responded well to cognitive behavioral therapy in the past.    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  She has remained seizure-free for greater than 10 years, not currently taking any antiseizure medications.  She was referred to neuropsychology for CBT, she has noticed significant improvement has had no episodes of the functional gait disturbance.      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  She vaguely recalls her symptoms, often would be preceded by a burning smell and then she will progress to a generalized tonic-clonic seizure.  Afterwards she would be fatigued.  She was treated initially with phenobarbital, then subsequently transition to Tegretol.  Around 2013, her Tegretol dose was gradually being titrated (although she is unable to clarify why, she believes she had been seizure-free for several years), she began having worsening episodes of ataxia, oversedation.  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  Her dose was reduced, and then she moved out of  the area.  Lost contact with her neurologist and ran out of the medication around 2016 and did not restart any antiseizure medications thereafter.  She denies any episodes of loss time, waking up with tongue bites or urinary incontinence.  She has remained seizure-free for at least a decade.    She notes since around 2013 she noticed that she developed some dysarthria and ataxia when she is fatigued.  Sleep or rest resolve the symptoms.  She has no weakness, no vision changes.  She says that around December 2022 she had about 1 straight month of dysarthria and difficulty with balance.  She does note that she has some positional vertigo in which if she looks up or turns her head rapidly from side to side, she will develop a spinning sensation that resolves with sitting very still.    She says that riding in the car can trigger her symptoms.    -Last seizure: Around age 39 years old        Epilepsy risk factors:    -Birth history: normal gestation and delivery, full term vaginal delivery, no cerebral palsy    -Developmental history: no delays    -Infectious - meningitis, encephalitis - HISTORY OF VIRAL ENCEPHALITIS     -Febrile seizures - denies    -Trauma - Traumatic brain injury - denies    -Tumors/Malignancy - denies    -Family History epilepsy - denies       Current anti-epileptic medications:    None    Past anti-epileptic medications:   Phenobarbital    Previous work-up:   -MRI 2013: normal MRI brain        Histories:    Medical:    Past Medical History:   Diagnosis Date    H/O thumb surgery 1998         Surgical:    Past Surgical History:   Procedure Laterality Date    BUNIONECTOMY  2013         Family:    Family History   Problem Relation Age of Onset    Aneurysm Mother     Hearing loss Mother     Dementia Father          Social:    Social History     Socioeconomic History    Marital status:      Spouse name: Not on file    Number of children: Not on file    Years of education: Not on file    Highest  education level: Not on file   Occupational History    Not on file   Tobacco Use    Smoking status: Never    Smokeless tobacco: Never   Substance and Sexual Activity    Alcohol use: Not Currently    Drug use: Never    Sexual activity: Defer   Other Topics Concern    Not on file   Social History Narrative    Not on file     Social Drivers of Health     Financial Resource Strain: Not on file   Food Insecurity: Not on file   Transportation Needs: Not on file   Physical Activity: Not on file   Stress: Not on file   Social Connections: Not on file   Intimate Partner Violence: Not on file   Housing Stability: Not on file       Allergies:  No Known Allergies    Current Medications:    Current Outpatient Medications   Medication Sig Dispense Refill    alendronate (Fosamax) 70 mg tablet Take 1 tablet (70 mg total) by mouth every 7 (seven) days      diphenhydrAMINE (Benadryl Allergy) 25 mg tablet 2 tablet as needed Orally at bedtime      ibuprofen (AdviL) 200 mg tablet every 12 hours      meclizine (ANTIVERT) 25 mg tablet Take 1 tablet (25 mg total) by mouth 3 (three) times a day as needed for dizziness 45 tablet 2    calcium carbonate-vitamin D3 (Calcium 500 With D) 500 mg(1,250mg) -400 unit tablet Take by mouth      carbamazepine (CARBATROL) 100 mg 12 hr capsule 1 in am, 1 at night 50 10     No current facility-administered medications for this visit.       Review of Systems:  See HPI. All other systems were reviewed and were negative.     OBJECTIVE    Heart Rate:  [62] 62  Resp:  [18] 18  SpO2:  [99 %] 99 %  BP: (120)/(90) 120/90      Physical Exam:         Neurological exam:   Mentation and Language  She is pleasant and attentive throughout the interview.  She provides a clear autobiographical history. There is no apparent deficit of comprehension.  There is functional stuttering and slow speech without paraphasic errors or incorrect substitutions.  She initially appears uncomfortable, but with distraction and further  "conversation, she smiles pleasantly and stuttering resolves with distraction.    Cranial Nerves  Extraocular movements intact without nystagmus. Visual fields intact to confrontation. Face symmetrical at rest and with activation with full sensation to light touch and pinprick in V1, V2 and V3 distributions. The tongue protrudes in the midline. The palate elevates symmetrically. There is no tremor or other movements of the face.  Negative head thrust test    Motor  Right: deltoid 5/5, biceps 5/5, triceps 5/5, interossei 5/5, iliopsoas 5/5, hamstring 5/5, quads 5/5, TA 5/5, gastroc/soleus 5/5    Left: deltoid 5/5, biceps 5/5, triceps 5/5, interossei 5/5, iliopsoas 5/5, hamstring 5/5, quads 5/5, TA 5/5, gastroc/soleus 5/5     Sensation  Intact to light touch bilaterally    Muscle Stretch Reflexes  Graded 2+ and symmetric in bilateral biceps, triceps, brachioradialis, patella, and Achilles.       Coordination  There is no dysmetria with finger nose finger.      Gait and Station  She has a normal gait and station.  Good arm swing bilaterally.          Labs:      CBC with Platelet:  No results found for: \"WBC\", \"HGB\", \"HCT\", \"PLT\", \"RBC\", \"MCV\", \"MCH\", \"MCHC\", \"RDW\", \"MPV\"  Automated Differential: No results found for: \"NEUTROABS\", \"NEUTROPCT\", \"IMMGRAN\", \"LYMPHOPCT\", \"LYMPHOABS\", \"MONOMAN\", \"MONOPCT\", \"MONOSABS\", \"EOSMAN\", \"EOSPCT\", \"EOSABS\", \"BASOSMAN\", \"BASOSABS\", \"BASOPCT\"  Absolute Count:  No results found for: \"NEUTROABS\", \"LYMPHSABS\", \"MONOABS\", \"EOSABS\"  Comp: No results found for: \"NA\", \"K\", \"CL\", \"CO2\", \"BUN\", \"CREATININE\", \"GLUCOSE\", \"CALCIUM\", \"PROT\", \"ALBUMIN\", \"AST\", \"ALT\", \"ALKPHOS\", \"BILITOT\"  Lipid: No results found for: \"CHOL\", \"TRIG\", \"HDLC\", \"LDLC\", \"HDL\", \"LDL\"  TSH: No results found for: \"TSH\"    Imaging studies:    No results found.    Assessment:  Pleasant 68-year-old right-handed female with history of epilepsy and functional gait disturbance presents presents for follow-up.  She has responded " exquisitely well to CBT, however over the past few months, she has had a couple of episodes typically associated with stress and fatigue.  I advised her that it may be worthwhile to revisit CBT in the future if she is notices an increase in her functional symptoms.  From the seizure standpoint, she has been seizure-free for greater than 10 years and not currently on medications, we will continue to monitor for now.  I reviewed my impression and plan with the patient.  All questions were answered        Plan:   -f/u 1 year      Polo Ribeiro MD      Dictation done using voice recognition software to aid in this documentation. Sometimes words are not transcribed exactly as they were spoken. There may be uncorrected errors or inaccuracies within the document despite efforts made to avoid them.